# Patient Record
Sex: MALE | Race: WHITE | NOT HISPANIC OR LATINO | URBAN - METROPOLITAN AREA
[De-identification: names, ages, dates, MRNs, and addresses within clinical notes are randomized per-mention and may not be internally consistent; named-entity substitution may affect disease eponyms.]

---

## 2019-01-03 ENCOUNTER — EMERGENCY (EMERGENCY)
Facility: HOSPITAL | Age: 64
LOS: 1 days | End: 2019-01-03
Payer: OTHER MISCELLANEOUS

## 2019-01-03 PROCEDURE — 99283 EMERGENCY DEPT VISIT LOW MDM: CPT | Mod: 25

## 2019-01-03 PROCEDURE — 99053 MED SERV 10PM-8AM 24 HR FAC: CPT

## 2020-08-28 ENCOUNTER — OUTPATIENT (OUTPATIENT)
Dept: OUTPATIENT SERVICES | Facility: HOSPITAL | Age: 65
LOS: 1 days | End: 2020-08-28
Payer: COMMERCIAL

## 2020-08-28 PROCEDURE — 93010 ELECTROCARDIOGRAM REPORT: CPT | Mod: NC

## 2020-09-07 DIAGNOSIS — Z01.818 ENCOUNTER FOR OTHER PREPROCEDURAL EXAMINATION: ICD-10-CM

## 2020-09-07 PROBLEM — Z00.00 ENCOUNTER FOR PREVENTIVE HEALTH EXAMINATION: Status: ACTIVE | Noted: 2020-09-07

## 2020-09-08 ENCOUNTER — APPOINTMENT (OUTPATIENT)
Dept: DISASTER EMERGENCY | Facility: CLINIC | Age: 65
End: 2020-09-08

## 2020-09-09 LAB — SARS-COV-2 N GENE NPH QL NAA+PROBE: NOT DETECTED

## 2020-09-11 ENCOUNTER — OUTPATIENT (OUTPATIENT)
Dept: OUTPATIENT SERVICES | Facility: HOSPITAL | Age: 65
LOS: 1 days | End: 2020-09-11

## 2022-11-29 ENCOUNTER — OFFICE (OUTPATIENT)
Dept: URBAN - METROPOLITAN AREA CLINIC 38 | Facility: CLINIC | Age: 67
Setting detail: OPHTHALMOLOGY
End: 2022-11-29
Payer: COMMERCIAL

## 2022-11-29 DIAGNOSIS — H52.4: ICD-10-CM

## 2022-11-29 DIAGNOSIS — H35.412: ICD-10-CM

## 2022-11-29 DIAGNOSIS — H35.372: ICD-10-CM

## 2022-11-29 DIAGNOSIS — H01.001: ICD-10-CM

## 2022-11-29 DIAGNOSIS — H01.004: ICD-10-CM

## 2022-11-29 DIAGNOSIS — H43.813: ICD-10-CM

## 2022-11-29 DIAGNOSIS — H25.13: ICD-10-CM

## 2022-11-29 PROCEDURE — 92014 COMPRE OPH EXAM EST PT 1/>: CPT | Performed by: OPTOMETRIST

## 2022-11-29 PROCEDURE — 92015 DETERMINE REFRACTIVE STATE: CPT | Performed by: OPTOMETRIST

## 2022-11-29 PROCEDURE — 92201 OPSCPY EXTND RTA DRAW UNI/BI: CPT | Performed by: OPTOMETRIST

## 2022-11-29 ASSESSMENT — REFRACTION_AUTOREFRACTION
OS_AXIS: 086
OS_SPHERE: -3.25
OD_SPHERE: -2.75
OD_CYLINDER: -0.25
OS_CYLINDER: -0.25
OD_AXIS: 107

## 2022-11-29 ASSESSMENT — LID EXAM ASSESSMENTS
OS_BLEPHARITIS: LUL T 1+
OD_BLEPHARITIS: RUL T 1+

## 2022-11-29 ASSESSMENT — REFRACTION_MANIFEST
OD_CYLINDER: -0.50
OD_ADD: +2.75
OD_VA2: 20/20(J1+)
OD_SPHERE: -2.75
OS_AXIS: 086
OS_CYLINDER: -0.25
OU_VA: 20/15
OS_SPHERE: -3.25
OS_ADD: +2.75
OD_ADD: +2.75
OS_VA2: 20/20(J1+)
OD_CYLINDER: -0.25
OD_AXIS: 105
OD_VA1: 20/15
OD_AXIS: 107
OS_SPHERE: -3.25
OS_AXIS: 085
OD_SPHERE: -2.75
OS_VA1: 20/15
OS_ADD: +2.75
OS_CYLINDER: PLANO

## 2022-11-29 ASSESSMENT — REFRACTION_CURRENTRX
OD_SPHERE: -2.50
OS_AXIS: 076
OD_AXIS: 109
OD_ADD: +2.50
OS_ADD: +2.50
OS_CYLINDER: -0.75
OS_VPRISM_DIRECTION: PROGS
OD_VPRISM_DIRECTION: PROGS
OD_CYLINDER: -0.50
OS_OVR_VA: 20/
OD_OVR_VA: 20/
OS_SPHERE: -3.25

## 2022-11-29 ASSESSMENT — SPHEQUIV_DERIVED
OS_SPHEQUIV: -3.375
OS_SPHEQUIV: -3.375
OD_SPHEQUIV: -2.875
OD_SPHEQUIV: -2.875
OD_SPHEQUIV: -3

## 2022-11-29 ASSESSMENT — KERATOMETRY
OD_K2POWER_DIOPTERS: 46.25
OS_K1POWER_DIOPTERS: 45.50
OS_AXISANGLE_DEGREES: 068
OD_AXISANGLE_DEGREES: 113
OD_K1POWER_DIOPTERS: 45.75
OS_K2POWER_DIOPTERS: 45.75
METHOD_AUTO_MANUAL: AUTO

## 2022-11-29 ASSESSMENT — VISUAL ACUITY
OS_BCVA: 20/20-2
OD_BCVA: 20/20

## 2022-11-29 ASSESSMENT — CONFRONTATIONAL VISUAL FIELD TEST (CVF)
OS_FINDINGS: FULL
OD_FINDINGS: FULL

## 2022-11-29 ASSESSMENT — AXIALLENGTH_DERIVED
OS_AL: 24.1387
OD_AL: 23.7955
OD_AL: 23.7955
OD_AL: 23.8451
OS_AL: 24.1387

## 2022-11-29 ASSESSMENT — TONOMETRY
OS_IOP_MMHG: 20
OD_IOP_MMHG: 20

## 2023-07-14 ENCOUNTER — APPOINTMENT (OUTPATIENT)
Dept: ULTRASOUND IMAGING | Facility: CLINIC | Age: 68
End: 2023-07-14
Payer: COMMERCIAL

## 2023-07-14 PROCEDURE — 76536 US EXAM OF HEAD AND NECK: CPT

## 2023-12-05 ENCOUNTER — OFFICE (OUTPATIENT)
Dept: URBAN - METROPOLITAN AREA CLINIC 38 | Facility: CLINIC | Age: 68
Setting detail: OPHTHALMOLOGY
End: 2023-12-05
Payer: MEDICARE

## 2023-12-05 DIAGNOSIS — H35.40: ICD-10-CM

## 2023-12-05 DIAGNOSIS — H35.412: ICD-10-CM

## 2023-12-05 DIAGNOSIS — H35.372: ICD-10-CM

## 2023-12-05 DIAGNOSIS — H01.001: ICD-10-CM

## 2023-12-05 DIAGNOSIS — H01.004: ICD-10-CM

## 2023-12-05 DIAGNOSIS — H25.13: ICD-10-CM

## 2023-12-05 DIAGNOSIS — H43.813: ICD-10-CM

## 2023-12-05 DIAGNOSIS — H52.4: ICD-10-CM

## 2023-12-05 PROCEDURE — 92015 DETERMINE REFRACTIVE STATE: CPT | Performed by: OPTOMETRIST

## 2023-12-05 PROCEDURE — 92014 COMPRE OPH EXAM EST PT 1/>: CPT | Performed by: OPTOMETRIST

## 2023-12-05 ASSESSMENT — REFRACTION_CURRENTRX
OD_SPHERE: -2.50
OS_CYLINDER: -0.50
OS_SPHERE: -3.50
OD_AXIS: 115
OS_OVR_VA: 20/
OD_OVR_VA: 20/
OD_CYLINDER: -0.50
OS_AXIS: 084
OS_ADD: +2.50
OD_ADD: +2.50
OD_VPRISM_DIRECTION: PROGS
OS_VPRISM_DIRECTION: PROGS

## 2023-12-05 ASSESSMENT — REFRACTION_MANIFEST
OS_VA1: 20/20
OS_AXIS: 085
OS_SPHERE: -3.00
OD_AXIS: 107
OD_VA1: 20/15
OS_AXIS: 086
OD_ADD: +2.75
OD_CYLINDER: -0.25
OD_CYLINDER: -0.25
OS_CYLINDER: -0.25
OS_VA2: 20/20(J1+)
OD_SPHERE: -2.75
OS_SPHERE: -3.25
OD_AXIS: 105
OU_VA: 20/15
OS_CYLINDER: PLANO
OS_AXIS: 084
OD_AXIS: 118
OS_SPHERE: -3.25
OS_ADD: +2.50
OS_ADD: +2.75
OD_ADD: +2.50
OD_SPHERE: -2.50
OD_ADD: +2.75
OS_ADD: +2.75
OD_VA1: 20/20
OD_CYLINDER: -0.50
OS_VA1: 20/15
OS_CYLINDER: -0.25
OD_SPHERE: -2.75
OD_VA2: 20/20(J1+)

## 2023-12-05 ASSESSMENT — CONFRONTATIONAL VISUAL FIELD TEST (CVF)
OD_FINDINGS: FULL
OS_FINDINGS: FULL

## 2023-12-05 ASSESSMENT — REFRACTION_AUTOREFRACTION
OD_CYLINDER: -0.50
OD_AXIS: 118
OS_CYLINDER: -0.25
OS_SPHERE: -3.00
OS_AXIS: 084
OD_SPHERE: -2.50

## 2023-12-05 ASSESSMENT — SPHEQUIV_DERIVED
OS_SPHEQUIV: -3.375
OD_SPHEQUIV: -3
OD_SPHEQUIV: -2.625
OD_SPHEQUIV: -2.875
OS_SPHEQUIV: -3.125
OD_SPHEQUIV: -2.75
OS_SPHEQUIV: -3.125

## 2023-12-05 ASSESSMENT — LID EXAM ASSESSMENTS
OD_BLEPHARITIS: RUL T 1+
OS_BLEPHARITIS: LUL T 1+

## 2024-10-04 ENCOUNTER — APPOINTMENT (OUTPATIENT)
Dept: ENDOCRINOLOGY | Facility: CLINIC | Age: 69
End: 2024-10-04
Payer: MEDICARE

## 2024-10-04 VITALS
HEIGHT: 71 IN | WEIGHT: 216 LBS | OXYGEN SATURATION: 95 % | DIASTOLIC BLOOD PRESSURE: 100 MMHG | TEMPERATURE: 96.9 F | HEART RATE: 78 BPM | BODY MASS INDEX: 30.24 KG/M2 | SYSTOLIC BLOOD PRESSURE: 160 MMHG

## 2024-10-04 DIAGNOSIS — E66.811 OBESITY, CLASS 1: ICD-10-CM

## 2024-10-04 DIAGNOSIS — Z87.09 PERSONAL HISTORY OF OTHER DISEASES OF THE RESPIRATORY SYSTEM: ICD-10-CM

## 2024-10-04 DIAGNOSIS — Z82.49 FAMILY HISTORY OF ISCHEMIC HEART DISEASE AND OTHER DISEASES OF THE CIRCULATORY SYSTEM: ICD-10-CM

## 2024-10-04 DIAGNOSIS — Z78.9 OTHER SPECIFIED HEALTH STATUS: ICD-10-CM

## 2024-10-04 DIAGNOSIS — Z77.22 CONTACT WITH AND (SUSPECTED) EXPOSURE TO ENVIRONMENTAL TOBACCO SMOKE (ACUTE) (CHRONIC): ICD-10-CM

## 2024-10-04 DIAGNOSIS — Z80.8 FAMILY HISTORY OF MALIGNANT NEOPLASM OF OTHER ORGANS OR SYSTEMS: ICD-10-CM

## 2024-10-04 DIAGNOSIS — E06.3 AUTOIMMUNE THYROIDITIS: ICD-10-CM

## 2024-10-04 DIAGNOSIS — Z83.511 FAMILY HISTORY OF GLAUCOMA: ICD-10-CM

## 2024-10-04 DIAGNOSIS — E04.2 NONTOXIC MULTINODULAR GOITER: ICD-10-CM

## 2024-10-04 DIAGNOSIS — K21.9 GASTRO-ESOPHAGEAL REFLUX DISEASE W/OUT ESOPHAGITIS: ICD-10-CM

## 2024-10-04 DIAGNOSIS — E78.5 HYPERLIPIDEMIA, UNSPECIFIED: ICD-10-CM

## 2024-10-04 DIAGNOSIS — I10 ESSENTIAL (PRIMARY) HYPERTENSION: ICD-10-CM

## 2024-10-04 PROCEDURE — 99204 OFFICE O/P NEW MOD 45 MIN: CPT

## 2024-10-04 RX ORDER — METOPROLOL TARTRATE 100 MG/1
100 TABLET ORAL TWICE DAILY
Refills: 0 | Status: ACTIVE | COMMUNITY

## 2024-10-04 RX ORDER — MV-MIN/FOLIC/K1/LYCOPEN/LUTEIN 300-60 MCG
TABLET ORAL DAILY
Refills: 0 | Status: ACTIVE | COMMUNITY

## 2024-10-04 RX ORDER — FAMOTIDINE 20 MG/1
20 TABLET, FILM COATED ORAL
Qty: 90 | Refills: 0 | Status: ACTIVE | COMMUNITY

## 2024-10-04 RX ORDER — LEVOTHYROXINE SODIUM 0.03 MG/1
25 TABLET ORAL
Qty: 90 | Refills: 2 | Status: ACTIVE | COMMUNITY
Start: 2024-10-04 | End: 1900-01-01

## 2024-10-04 NOTE — HISTORY OF PRESENT ILLNESS
[FreeTextEntry1] : 69 year old man  with medical h/o obesity (BMI 30), thyroid nodule, hypothyroidism, GERD, HTN, BPH s/p TURP presented to establish care.   He has a lot of stress as her daughter is currently living with him. She is pregnant and it's complicated by pre-eclampsia.   Labs-  April 2024- TSH 3.7, LDL-C 100, Tg 422 July 2023- TSH 4.7, Free T 4 of 1,    Thyroid US July 2023-  bilaterally numerous small hypoechoic foci.  llp- 1.6 x 1.5 x 1.1 cm isoechoic nodule   Started levothyroxine 25 mcg daily. Takes the medication at midnight, last meal of day is at 6pm.  Not on any medications for high cholesterol.   He was 230 in his 20's. Lost weight to 170's with diet to be able to be a  about 35 years ago. Overtime he gradually regained the weight.   He works at three part time jobs, 3-4 week, and sometimes takes night shifts.

## 2024-10-04 NOTE — ASSESSMENT
[FreeTextEntry1] : 1. Hypothyroid Plan:  - c/w levothyroxine 25 mcg daily - will check Thyroid profile   2. MNG Plan:  - Thyroid US to evaluate for interval growth   3. HLP Plan: - Repeat lipid panel fasting  4. Weight management Plan:  - Nutritional counselling was provided  - Sleep study referral was recommended.   Follow up in 3 month

## 2024-10-07 DIAGNOSIS — M65.4 RADIAL STYLOID TENOSYNOVITIS [DE QUERVAIN]: ICD-10-CM

## 2024-10-07 DIAGNOSIS — K46.9 UNSPECIFIED ABDOMINAL HERNIA W/OUT OBSTRUCTION OR GANGRENE: ICD-10-CM

## 2024-10-07 DIAGNOSIS — Z87.438 PERSONAL HISTORY OF OTHER DISEASES OF MALE GENITAL ORGANS: ICD-10-CM

## 2024-10-07 RX ORDER — LEVOTHYROXINE SODIUM 0.03 MG/1
25 TABLET ORAL DAILY
Refills: 0 | Status: COMPLETED | COMMUNITY
End: 2024-10-07

## 2024-12-08 ENCOUNTER — OUTPATIENT (OUTPATIENT)
Dept: OUTPATIENT SERVICES | Facility: HOSPITAL | Age: 69
LOS: 1 days | End: 2024-12-08
Payer: COMMERCIAL

## 2024-12-08 DIAGNOSIS — G47.33 OBSTRUCTIVE SLEEP APNEA (ADULT) (PEDIATRIC): ICD-10-CM

## 2024-12-08 PROCEDURE — 95810 POLYSOM 6/> YRS 4/> PARAM: CPT

## 2024-12-08 PROCEDURE — 95810 POLYSOM 6/> YRS 4/> PARAM: CPT | Mod: 26

## 2024-12-10 ENCOUNTER — APPOINTMENT (OUTPATIENT)
Dept: ULTRASOUND IMAGING | Facility: CLINIC | Age: 69
End: 2024-12-10

## 2024-12-10 PROCEDURE — 76536 US EXAM OF HEAD AND NECK: CPT

## 2024-12-17 ENCOUNTER — NON-APPOINTMENT (OUTPATIENT)
Age: 69
End: 2024-12-17

## 2024-12-20 ENCOUNTER — NON-APPOINTMENT (OUTPATIENT)
Age: 69
End: 2024-12-20

## 2025-01-09 ENCOUNTER — APPOINTMENT (OUTPATIENT)
Dept: ENDOCRINOLOGY | Facility: CLINIC | Age: 70
End: 2025-01-09
Payer: MEDICARE

## 2025-01-09 VITALS
HEIGHT: 71 IN | HEART RATE: 70 BPM | TEMPERATURE: 97.1 F | BODY MASS INDEX: 29.54 KG/M2 | WEIGHT: 211 LBS | OXYGEN SATURATION: 97 % | SYSTOLIC BLOOD PRESSURE: 164 MMHG | DIASTOLIC BLOOD PRESSURE: 98 MMHG

## 2025-01-09 DIAGNOSIS — E06.3 AUTOIMMUNE THYROIDITIS: ICD-10-CM

## 2025-01-09 DIAGNOSIS — E78.5 HYPERLIPIDEMIA, UNSPECIFIED: ICD-10-CM

## 2025-01-09 DIAGNOSIS — E55.9 VITAMIN D DEFICIENCY, UNSPECIFIED: ICD-10-CM

## 2025-01-09 PROCEDURE — 99214 OFFICE O/P EST MOD 30 MIN: CPT

## 2025-01-17 ENCOUNTER — OFFICE (OUTPATIENT)
Dept: URBAN - METROPOLITAN AREA CLINIC 38 | Facility: CLINIC | Age: 70
Setting detail: OPHTHALMOLOGY
End: 2025-01-17
Payer: MEDICARE

## 2025-01-17 DIAGNOSIS — H35.373: ICD-10-CM

## 2025-01-17 DIAGNOSIS — H02.834: ICD-10-CM

## 2025-01-17 DIAGNOSIS — H11.152: ICD-10-CM

## 2025-01-17 DIAGNOSIS — H43.813: ICD-10-CM

## 2025-01-17 DIAGNOSIS — H25.13: ICD-10-CM

## 2025-01-17 DIAGNOSIS — H01.001: ICD-10-CM

## 2025-01-17 DIAGNOSIS — H02.831: ICD-10-CM

## 2025-01-17 DIAGNOSIS — H01.004: ICD-10-CM

## 2025-01-17 DIAGNOSIS — H35.40: ICD-10-CM

## 2025-01-17 PROCEDURE — 92134 CPTRZ OPH DX IMG PST SGM RTA: CPT | Performed by: OPHTHALMOLOGY

## 2025-01-17 PROCEDURE — 92014 COMPRE OPH EXAM EST PT 1/>: CPT | Performed by: OPHTHALMOLOGY

## 2025-01-17 ASSESSMENT — REFRACTION_MANIFEST
OD_ADD: +2.75
OD_SPHERE: -2.75
OD_VA1: 20/20
OS_SPHERE: -3.00
OS_SPHERE: -3.25
OU_VA: 20/20
OD_AXIS: 105
OD_AXIS: 118
OS_ADD: +2.75
OS_ADD: +2.50
OD_VA2: 20/20(J1+)
OS_VA1: 20/20-2
OD_ADD: +2.75
OD_CYLINDER: -0.25
OS_CYLINDER: -0.25
OS_CYLINDER: -0.50
OS_VA1: 20/20
OS_CYLINDER: -0.25
OD_SPHERE: -2.50
OS_ADD: +2.75
OD_CYLINDER: -0.25
OD_ADD: +2.50
OS_AXIS: 085
OS_SPHERE: -3.00
OD_SPHERE: -2.50
OS_AXIS: 084
OS_AXIS: 085
OD_AXIS: 100
OD_VA1: 20/20-
OS_VA2: 20/20(J1+)
OD_CYLINDER: -0.75

## 2025-01-17 ASSESSMENT — REFRACTION_CURRENTRX
OD_ADD: +2.50
OS_SPHERE: -3.50
OD_SPHERE: -2.50
OD_VPRISM_DIRECTION: PROGS
OS_OVR_VA: 20/
OS_CYLINDER: -0.25
OS_SPHERE: -3.00
OD_AXIS: 115
OS_OVR_VA: 20/
OD_VPRISM_DIRECTION: PROGS
OS_ADD: +2.50
OD_CYLINDER: -0.50
OD_OVR_VA: 20/
OD_SPHERE: -2.25
OS_ADD: +2.50
OS_VPRISM_DIRECTION: PROGS
OS_AXIS: 087
OD_CYLINDER: -0.50
OS_AXIS: 084
OD_OVR_VA: 20/
OD_ADD: +2.50
OS_VPRISM_DIRECTION: PROGS
OD_AXIS: 098
OS_CYLINDER: -0.50

## 2025-01-17 ASSESSMENT — KERATOMETRY
OS_AXISANGLE_DEGREES: 069
OD_K2POWER_DIOPTERS: 46.00
OS_K1POWER_DIOPTERS: 45.50
OD_K1POWER_DIOPTERS: 45.50
OD_AXISANGLE_DEGREES: 125
OS_K2POWER_DIOPTERS: 45.75
METHOD_AUTO_MANUAL: AUTO

## 2025-01-17 ASSESSMENT — LID POSITION - DERMATOCHALASIS
OS_DERMATOCHALASIS: LUL 1+
OD_DERMATOCHALASIS: RUL 1+

## 2025-01-17 ASSESSMENT — CONFRONTATIONAL VISUAL FIELD TEST (CVF)
OS_FINDINGS: FULL
OD_FINDINGS: FULL

## 2025-01-17 ASSESSMENT — REFRACTION_AUTOREFRACTION
OS_CYLINDER: -0.50
OS_AXIS: 082
OD_SPHERE: -2.00
OD_AXIS: 101
OD_CYLINDER: -0.75
OS_SPHERE: -3.00

## 2025-01-17 ASSESSMENT — LID EXAM ASSESSMENTS
OD_BLEPHARITIS: RUL T 1+
OS_BLEPHARITIS: LUL T 1+

## 2025-01-17 ASSESSMENT — VISUAL ACUITY
OD_BCVA: 20/20-
OS_BCVA: 20/25

## 2025-07-01 ENCOUNTER — NON-APPOINTMENT (OUTPATIENT)
Age: 70
End: 2025-07-01

## 2025-08-25 ENCOUNTER — NON-APPOINTMENT (OUTPATIENT)
Age: 70
End: 2025-08-25

## 2025-08-27 ENCOUNTER — APPOINTMENT (OUTPATIENT)
Dept: ENDOCRINOLOGY | Facility: CLINIC | Age: 70
End: 2025-08-27
Payer: MEDICARE

## 2025-08-27 VITALS
BODY MASS INDEX: 29.4 KG/M2 | WEIGHT: 210 LBS | OXYGEN SATURATION: 99 % | HEIGHT: 71 IN | HEART RATE: 76 BPM | DIASTOLIC BLOOD PRESSURE: 102 MMHG | SYSTOLIC BLOOD PRESSURE: 146 MMHG

## 2025-08-27 DIAGNOSIS — E04.2 NONTOXIC MULTINODULAR GOITER: ICD-10-CM

## 2025-08-27 DIAGNOSIS — E06.3 AUTOIMMUNE THYROIDITIS: ICD-10-CM

## 2025-08-27 DIAGNOSIS — Z13.820 ENCOUNTER FOR SCREENING FOR OSTEOPOROSIS: ICD-10-CM

## 2025-08-27 DIAGNOSIS — E78.5 HYPERLIPIDEMIA, UNSPECIFIED: ICD-10-CM

## 2025-08-27 PROCEDURE — 99214 OFFICE O/P EST MOD 30 MIN: CPT

## 2025-08-27 PROCEDURE — G2211 COMPLEX E/M VISIT ADD ON: CPT

## 2025-08-27 RX ORDER — ROSUVASTATIN CALCIUM 10 MG/1
10 TABLET, FILM COATED ORAL
Qty: 90 | Refills: 0 | Status: ACTIVE | COMMUNITY
Start: 2025-08-27 | End: 1900-01-01